# Patient Record
Sex: FEMALE | URBAN - METROPOLITAN AREA
[De-identification: names, ages, dates, MRNs, and addresses within clinical notes are randomized per-mention and may not be internally consistent; named-entity substitution may affect disease eponyms.]

---

## 2017-10-12 ENCOUNTER — APPOINTMENT (RX ONLY)
Dept: URBAN - METROPOLITAN AREA CLINIC 7 | Facility: CLINIC | Age: 72
Setting detail: DERMATOLOGY
End: 2017-10-12

## 2017-10-12 DIAGNOSIS — Z87.891 PERSONAL HISTORY OF NICOTINE DEPENDENCE: ICD-10-CM

## 2017-10-12 DIAGNOSIS — Z71.89 OTHER SPECIFIED COUNSELING: ICD-10-CM

## 2017-10-12 DIAGNOSIS — Z48.817 ENCOUNTER FOR SURGICAL AFTERCARE FOLLOWING SURGERY ON THE SKIN AND SUBCUTANEOUS TISSUE: ICD-10-CM

## 2017-10-12 DIAGNOSIS — D22 MELANOCYTIC NEVI: ICD-10-CM

## 2017-10-12 PROBLEM — I10 ESSENTIAL (PRIMARY) HYPERTENSION: Status: ACTIVE | Noted: 2017-10-12

## 2017-10-12 PROBLEM — Z85.828 PERSONAL HISTORY OF OTHER MALIGNANT NEOPLASM OF SKIN: Status: ACTIVE | Noted: 2017-10-12

## 2017-10-12 PROBLEM — D22.71 MELANOCYTIC NEVI OF RIGHT LOWER LIMB, INCLUDING HIP: Status: ACTIVE | Noted: 2017-10-12

## 2017-10-12 PROBLEM — D22.72 MELANOCYTIC NEVI OF LEFT LOWER LIMB, INCLUDING HIP: Status: ACTIVE | Noted: 2017-10-12

## 2017-10-12 PROCEDURE — ? COUNSELING

## 2017-10-12 PROCEDURE — ? POST-OP WOUND EVALUATION

## 2017-10-12 PROCEDURE — 99202 OFFICE O/P NEW SF 15 MIN: CPT

## 2017-10-12 ASSESSMENT — LOCATION DETAILED DESCRIPTION DERM
LOCATION DETAILED: LEFT PROXIMAL DORSAL FOREARM
LOCATION DETAILED: LEFT SUPERIOR MEDIAL BUCCAL CHEEK
LOCATION DETAILED: LEFT ANTERIOR DISTAL THIGH
LOCATION DETAILED: RIGHT PROXIMAL PRETIBIAL REGION
LOCATION DETAILED: RIGHT MEDIAL POSTERIOR ANKLE

## 2017-10-12 ASSESSMENT — LOCATION SIMPLE DESCRIPTION DERM
LOCATION SIMPLE: RIGHT PRETIBIAL REGION
LOCATION SIMPLE: LEFT FOREARM
LOCATION SIMPLE: LEFT CHEEK
LOCATION SIMPLE: RIGHT ANKLE
LOCATION SIMPLE: LEFT THIGH

## 2017-10-12 ASSESSMENT — LOCATION ZONE DERM
LOCATION ZONE: FACE
LOCATION ZONE: ARM
LOCATION ZONE: LEG

## 2017-10-12 NOTE — PROCEDURE: POST-OP WOUND EVALUATION
Detail Level: Detailed
Wound Diameter In Cm(Optional): 0
Body Location Override (Optional): right ankle
Add 93240 Cpt? (Important Note: In 2017 The Use Of 98111 Is Being Tracked By Cms To Determine Future Global Period Reimbursement For Global Periods): no
Percent Reepithelialization (Optional): 100
Wound Evaluated By (Optional): Sheldon
Wound Color?: pink

## 2017-10-12 NOTE — HPI: WOUND CHECK (POST-OP)
Date Of Procedure: 3weeks ago in [place]
Additional History: Patient visited urgent care Tuesday night (1.5 days ago) and was prescribed Bactrim and mupirocin.  She reports the incision has since much improved but is leaving for [place] on 10/21 and would like reassurance.